# Patient Record
Sex: FEMALE | Race: WHITE | NOT HISPANIC OR LATINO | Employment: FULL TIME | URBAN - METROPOLITAN AREA
[De-identification: names, ages, dates, MRNs, and addresses within clinical notes are randomized per-mention and may not be internally consistent; named-entity substitution may affect disease eponyms.]

---

## 2024-08-04 ENCOUNTER — APPOINTMENT (OUTPATIENT)
Facility: HOSPITAL | Age: 64
End: 2024-08-04
Payer: COMMERCIAL

## 2024-08-04 ENCOUNTER — HOSPITAL ENCOUNTER (EMERGENCY)
Facility: HOSPITAL | Age: 64
Discharge: HOME | End: 2024-08-04
Attending: EMERGENCY MEDICINE | Admitting: EMERGENCY MEDICINE
Payer: COMMERCIAL

## 2024-08-04 VITALS
WEIGHT: 145 LBS | HEART RATE: 107 BPM | SYSTOLIC BLOOD PRESSURE: 122 MMHG | TEMPERATURE: 98.3 F | OXYGEN SATURATION: 95 % | BODY MASS INDEX: 21.98 KG/M2 | RESPIRATION RATE: 18 BRPM | DIASTOLIC BLOOD PRESSURE: 80 MMHG | HEIGHT: 68 IN

## 2024-08-04 DIAGNOSIS — M79.671 FOOT PAIN, RIGHT: Primary | ICD-10-CM

## 2024-08-04 PROCEDURE — 73610 X-RAY EXAM OF ANKLE: CPT

## 2024-08-04 PROCEDURE — 99283 EMERGENCY DEPT VISIT LOW MDM: CPT

## 2024-08-04 PROCEDURE — 73630 X-RAY EXAM OF FOOT: CPT

## 2024-08-04 NOTE — Clinical Note
[facility] EMERGENCY DEPARTMENT [place]  [address]  [facility] [address]  Phone: [phone]  Fax: [phone]    Larisa Rios was seen and treated in our emergency department on 8/4/2024.  She may return to work on 08/07/2024.         Thank you for choosing [facility].    London Perez, DO

## 2024-08-04 NOTE — DISCHARGE INSTRUCTIONS
Please return with worsening or change in symptoms.  Please follow-up with orthopedist on your paperwork.  Please take naproxen as we discussed, do not combine any other NSAIDs while taking this.

## 2024-08-04 NOTE — Clinical Note
[facility] EMERGENCY DEPARTMENT [place]  [address]  [facility] [address]  Phone: [phone]  Fax: [phone]    Larisa Rios was seen and treated in our emergency department on 8/4/2024.  She may return to work on 08/07/2024.         Thank you for choosing [facility].    London Perez, DO

## 2024-08-04 NOTE — Clinical Note
[facility] EMERGENCY DEPARTMENT [place]  [address]  [facility] [address]  Phone: [phone]  Fax: [phone]    Larisa Rios was seen and treated in our emergency department on 8/4/2024.  She may return to work on 08/07/2024.         Thank you for choosing [facility].    London Perez, DO

## 2024-08-04 NOTE — Clinical Note
[facility] EMERGENCY DEPARTMENT [place]  [address]  [facility] [address]  Phone: [phone]  Fax: [phone]    Larisa Rios was seen and treated in our emergency department on 8/4/2024.  She may return to work on 08/07/2024.         Thank you for choosing [facility].    London Perez, DO

## 2024-08-04 NOTE — Clinical Note
[facility] EMERGENCY DEPARTMENT [place]  [address]  [facility] [address]  Phone: [phone]  Fax: [phone]    Larisa Rios was seen and treated in our emergency department on 8/4/2024.  She may return to work on 08/07/2024.         Thank you for choosing [facility].    London Perez, DO

## 2024-08-05 NOTE — FSED PROVIDER NOTE
"Subjective  History of Present Illness:    Patient is a 64-year-old female who presents with right foot pain that began after driving home from work 2 days ago.  Patient states that the right inside of her ankle that wraps around to the bottom of her right foot.  Patient states she has no trauma or injury, states she was on her feet at work all day and then got in her car to drive home and when she got home she tried to put weight on it and it was painful.  Patient denies decreased range of motion or numbness/tingling.      Nurses Notes reviewed and agree, including vitals, allergies, social history and prior medical history.     REVIEW OF SYSTEMS: All systems reviewed and not pertinent unless noted.  Review of Systems    History reviewed. No pertinent past medical history.    Allergies:    Patient has no known allergies.      History reviewed. No pertinent surgical history.      Social History     Socioeconomic History    Marital status: Single   Tobacco Use    Smoking status: Every Day     Types: Cigarettes    Smokeless tobacco: Never   Substance and Sexual Activity    Alcohol use: Yes    Drug use: Never         History reviewed. No pertinent family history.    Objective  Physical Exam:  /80 (BP Location: Left arm, Patient Position: Sitting)   Pulse 107   Temp 98.3 °F (36.8 °C) (Oral)   Resp 18   Ht 172.7 cm (68\")   Wt 65.8 kg (145 lb)   SpO2 95%   BMI 22.05 kg/m²      Physical Exam  Constitutional:       Appearance: Well-developed. No acute distress  HENT:      Head: Normocephalic and atraumatic.      Mouth/Throat:      Mouth: Mucous membranes are moist.      Eyes:      Extraocular Movements: Extraocular movements intact.   Cardiovascular:      Rate and Rhythm: Normal rate  Pulmonary:      Effort: Pulmonary effort is normal. No respiratory distress.    tenderness or guarding noted  Musculoskeletal:         General: Minimal tenderness noted to right medial ankle, reports pain mainly with bearing " weight.  Full active range of motion of right foot and ankle with minimal pain.  Pulses 2+ right DP and PT.  No overlying skin discoloration or skin wounds.  No pain to palpation or edema noted of remainder of right lower extremity.  No pain to palpation, discoloration or edema noted to right popliteal or calf.      Extremities: Moves all 4s   Skin:     General: Skin is warm and dry.      Capillary Refill: Capillary refill takes less than 2 seconds.   Neurological:      Mental Status:Alert and oriented to person, place, and time.   Mentation is normal   Psychiatric:         Mood and Affect: Mood normal.         Behavior: Behavior normal.     Procedures    ED Course:         Lab Results (last 24 hours)       ** No results found for the last 24 hours. **             XR Ankle 3+ View Right    Result Date: 8/4/2024  XR ANKLE 3+ VW RIGHT, XR FOOT 3+ VW RIGHT Date of Exam: 8/4/2024 1:37 PM EDT Indication: pain Comparison: None available. Findings: No acute fracture is identified. No focal osseous abnormality is identified. The ankle mortise is congruent. The talar dome appears intact. There is a moderate enthesophyte along the inferior calcaneus. There are severe degenerative changes along the first metatarsophalangeal joint and mild degenerative changes along the interphalangeal joints. The soft tissues are unremarkable.     Impression: Impression: 1.No acute osseous process identified. 2.Degenerative changes as described above. Electronically Signed: London Goldstein MD  8/4/2024 2:09 PM EDT  Workstation ID: [id number]    XR Foot 3+ View Right    Result Date: 8/4/2024  XR ANKLE 3+ VW RIGHT, XR FOOT 3+ VW RIGHT Date of Exam: 8/4/2024 1:37 PM EDT Indication: pain Comparison: None available. Findings: No acute fracture is identified. No focal osseous abnormality is identified. The ankle mortise is congruent. The talar dome appears intact. There is a moderate enthesophyte along the inferior calcaneus. There are severe  degenerative changes along the first metatarsophalangeal joint and mild degenerative changes along the interphalangeal joints. The soft tissues are unremarkable.     Impression: Impression: 1.No acute osseous process identified. 2.Degenerative changes as described above. Electronically Signed: London Goldstein MD  8/4/2024 2:09 PM EDT  Workstation ID: [id number]        MDM  Number of Diagnoses or Management Options  Foot pain, right  Diagnosis management comments: Summary patient presenting with foot pain.  X-ray of the right foot and ankle unremarkable for any acute osseous abnormality.  Patient given symptomatic management instructions vital signs all within normal limits        Initial impression of presenting illness: Patient presents with right foot pain but is mainly when she bears weight, no specific trauma or injury noted.  Pain is right medial ankle wrapping to right sole of foot.  Foot is not red, hot, swollen.  Full active range of motion upon exam.    DDX: includes but is not limited to: Plantar fasciitis, sprain, strain, arthritis, stress fracture    Patient arrives comfortable vital signs medically stable with vitals interpreted by myself.     Pertinent results: X-ray shows degenerative changes, no other acute abnormalities      Medications - No data to display    Results/clinical rationale were discussed with patient and family numbers in the room      Data interpreted: Nursing notes reviewed, vital signs reviewed.  Labs independently interpreted by me     Counseling: Discussed the results above with the patient regarding need for admission or discharge.  Patient understands and agrees plan of care.  Discussed with patient to try over-the-counter Anaprox as patient states that Advil was helping, discussed she cannot take Advil and naproxen at the same time as these are the same class of medication and can cause serious side effects and she verbalizes understanding.  Discussed with patient I will also  refer her to orthopedics for continued evaluation. Discussed with patients the results from today's visit. Discussed with patient strict return precautions and they verbalize understanding. Recommend to them following up with primary care as soon as possible. Patient is discharged hemodynamically stable and comfortable.       -----  ED Disposition       ED Disposition   Discharge    Condition   Stable    Comment   --             Final diagnoses:   Foot pain, right      Your Follow-Up Providers       Schedule an appointment as soon as possible for a visit  with Elidia Betancur PharmD.    Specialty: Pharmacy  [address] [place] [address]  [phone]               Schedule an appointment as soon as possible for a visit  with Jossue Nguyễn MD.    Specialty: Orthopedic Surgery  [address] [place] [id number] [phone]                       Contact information for after-discharge care    Follow-up information has not been specified.                    Your medication list      as of August 4, 2024  2:35 PM     You have not been prescribed any medications.

## 2025-02-18 ENCOUNTER — APPOINTMENT (OUTPATIENT)
Facility: HOSPITAL | Age: 65
End: 2025-02-18
Payer: COMMERCIAL

## 2025-02-18 ENCOUNTER — HOSPITAL ENCOUNTER (EMERGENCY)
Facility: HOSPITAL | Age: 65
Discharge: HOME | End: 2025-02-18
Attending: EMERGENCY MEDICINE | Admitting: EMERGENCY MEDICINE
Payer: COMMERCIAL

## 2025-02-18 VITALS
HEART RATE: 85 BPM | BODY MASS INDEX: 22.5 KG/M2 | TEMPERATURE: 97.9 F | OXYGEN SATURATION: 99 % | WEIGHT: 140 LBS | DIASTOLIC BLOOD PRESSURE: 69 MMHG | RESPIRATION RATE: 20 BRPM | SYSTOLIC BLOOD PRESSURE: 115 MMHG | HEIGHT: 66 IN

## 2025-02-18 DIAGNOSIS — S86.912A KNEE STRAIN, LEFT, INITIAL ENCOUNTER: Primary | ICD-10-CM

## 2025-02-18 PROCEDURE — 73560 X-RAY EXAM OF KNEE 1 OR 2: CPT

## 2025-02-18 PROCEDURE — 99283 EMERGENCY DEPT VISIT LOW MDM: CPT

## 2025-02-18 RX ORDER — CYCLOBENZAPRINE HCL 10 MG
10 TABLET ORAL 3 TIMES DAILY PRN
Qty: 12 TABLET | Refills: 0 | Status: SHIPPED | OUTPATIENT
Start: 2025-02-18

## 2025-02-18 NOTE — FSED PROVIDER NOTE
Subjective   History of Present Illness  Patient presents to the emergency department for left knee pain.  Says she was at work last night and started having pain behind her knee.  Says it feels better when she massages the area worse with stretching.  Says she feels like she pulled a muscle.  No direct trauma or injury.  She has been putting ice on it with some improvement.  Denies any numbness weakness tingling swelling chest pain shortness of breath or other symptoms    History provided by:  Patient   used: No        Review of Systems   Musculoskeletal:         Knee pain   All other systems reviewed and are negative.      No past medical history on file.    Allergies   Allergen Reactions    Other Other (See Comments)     STATINS       No past surgical history on file.    No family history on file.    Social History     Socioeconomic History    Marital status: Single   Tobacco Use    Smoking status: Every Day     Types: Cigarettes    Smokeless tobacco: Never   Substance and Sexual Activity    Alcohol use: Yes    Drug use: Never           Objective   Physical Exam  Vitals and nursing note reviewed.   Constitutional:       Appearance: Normal appearance.   HENT:      Head: Normocephalic and atraumatic.      Nose: Nose normal.      Mouth/Throat:      Mouth: Mucous membranes are moist.      Pharynx: Oropharynx is clear.   Eyes:      Extraocular Movements: Extraocular movements intact.      Pupils: Pupils are equal, round, and reactive to light.   Cardiovascular:      Rate and Rhythm: Normal rate and regular rhythm.      Pulses:           Popliteal pulses are 2+ on the right side and 2+ on the left side.      Heart sounds: Normal heart sounds.   Pulmonary:      Effort: Pulmonary effort is normal. No respiratory distress.      Breath sounds: Normal breath sounds. No wheezing or rales.   Chest:      Chest wall: No tenderness.   Abdominal:      General: There is no distension.      Palpations: Abdomen  is soft.      Tenderness: There is no abdominal tenderness. There is no guarding or rebound.   Musculoskeletal:         General: No swelling, tenderness, deformity or signs of injury. Normal range of motion.      Cervical back: Normal range of motion and neck supple.      Right lower leg: No edema.      Left lower leg: No edema.      Comments: No palpable cords, discoloration, decreased range of motion, or other findings   Skin:     General: Skin is warm and dry.      Capillary Refill: Capillary refill takes less than 2 seconds.      Findings: No bruising or erythema.   Neurological:      General: No focal deficit present.      Mental Status: She is alert and oriented to person, place, and time.      Cranial Nerves: No cranial nerve deficit.   Psychiatric:         Mood and Affect: Mood normal.         Behavior: Behavior normal.         Procedures           ED Course                                           Medical Decision Making  Patient is alert and inserting questions though hemodynamically stable and afebrile.  Patient has no signs of DVT on physical exam.  Radiograph shows no acute fracture or dislocation.  History and physical consistent with muscle strain.  Given Flexeril for home.  Discharge    Amount and/or Complexity of Data Reviewed  Radiology: ordered. Decision-making details documented in ED Course.        Final diagnoses:   Knee strain, left, initial encounter       ED Disposition  ED Disposition       ED Disposition   Discharge    Condition   Stable    Comment   --               PATIENT CONNECTION - [facility] [address]  [phone]  Schedule an appointment as soon as possible for a visit   As needed    [facility] EMERGENCY DEPARTMENT [place]  [address]  [facility] [address] [phone]  Go to   As needed         Medication List        New Prescriptions      cyclobenzaprine 10 MG tablet  Commonly known as: FLEXERIL  Take 1 tablet by  mouth 3 (Three) Times a Day As Needed for Muscle Spasms.               Where to Get Your Medications        These medications were sent to [facility] [place], [place] - [address] - [phone]  - [phone]   [address] [place] [address]      Phone: [phone]   cyclobenzaprine 10 MG tablet